# Patient Record
Sex: MALE | Race: WHITE | NOT HISPANIC OR LATINO | Employment: FULL TIME | URBAN - NONMETROPOLITAN AREA
[De-identification: names, ages, dates, MRNs, and addresses within clinical notes are randomized per-mention and may not be internally consistent; named-entity substitution may affect disease eponyms.]

---

## 2021-12-15 ENCOUNTER — APPOINTMENT (OUTPATIENT)
Dept: GENERAL RADIOLOGY | Facility: HOSPITAL | Age: 36
End: 2021-12-15

## 2021-12-15 ENCOUNTER — APPOINTMENT (OUTPATIENT)
Dept: CARDIOLOGY | Facility: HOSPITAL | Age: 36
End: 2021-12-15

## 2021-12-15 ENCOUNTER — HOSPITAL ENCOUNTER (INPATIENT)
Facility: HOSPITAL | Age: 36
LOS: 2 days | Discharge: HOME | End: 2021-12-17
Attending: STUDENT IN AN ORGANIZED HEALTH CARE EDUCATION/TRAINING PROGRAM | Admitting: INTERNAL MEDICINE

## 2021-12-15 DIAGNOSIS — I48.91 ATRIAL FIBRILLATION WITH RVR (HCC): Primary | ICD-10-CM

## 2021-12-15 LAB
ALBUMIN SERPL-MCNC: 4.93 G/DL (ref 3.5–5.2)
ALBUMIN/GLOB SERPL: 1.6 G/DL
ALP SERPL-CCNC: 123 U/L (ref 39–117)
ALT SERPL W P-5'-P-CCNC: 35 U/L (ref 1–41)
ANION GAP SERPL CALCULATED.3IONS-SCNC: 14.2 MMOL/L (ref 5–15)
APTT PPP: 33.9 SECONDS (ref 25.5–35.4)
APTT PPP: 42.1 SECONDS (ref 25.5–35.4)
AST SERPL-CCNC: 22 U/L (ref 1–40)
BASOPHILS # BLD AUTO: 0.03 10*3/MM3 (ref 0–0.2)
BASOPHILS # BLD AUTO: 0.04 10*3/MM3 (ref 0–0.2)
BASOPHILS NFR BLD AUTO: 0.4 % (ref 0–1.5)
BASOPHILS NFR BLD AUTO: 0.4 % (ref 0–1.5)
BH CV ECHO MEAS - AO ROOT AREA (BSA CORRECTED): 1.5
BH CV ECHO MEAS - AO ROOT AREA: 8.6 CM^2
BH CV ECHO MEAS - AO ROOT DIAM: 3.3 CM
BH CV ECHO MEAS - BSA(HAYCOCK): 2.3 M^2
BH CV ECHO MEAS - BSA: 2.3 M^2
BH CV ECHO MEAS - BZI_BMI: 31.2 KILOGRAMS/M^2
BH CV ECHO MEAS - BZI_METRIC_HEIGHT: 182.9 CM
BH CV ECHO MEAS - BZI_METRIC_WEIGHT: 104.3 KG
BH CV ECHO MEAS - EDV(CUBED): 59.3 ML
BH CV ECHO MEAS - EDV(MOD-SP4): 54.9 ML
BH CV ECHO MEAS - EDV(TEICH): 65.9 ML
BH CV ECHO MEAS - EF(CUBED): 58.9 %
BH CV ECHO MEAS - EF(MOD-SP4): 57.9 %
BH CV ECHO MEAS - EF(TEICH): 51.1 %
BH CV ECHO MEAS - ESV(CUBED): 24.4 ML
BH CV ECHO MEAS - ESV(MOD-SP4): 23.1 ML
BH CV ECHO MEAS - ESV(TEICH): 32.2 ML
BH CV ECHO MEAS - FS: 25.6 %
BH CV ECHO MEAS - IVS/LVPW: 0.93
BH CV ECHO MEAS - IVSD: 1.4 CM
BH CV ECHO MEAS - LA DIMENSION: 3.3 CM
BH CV ECHO MEAS - LA/AO: 1
BH CV ECHO MEAS - LV DIASTOLIC VOL/BSA (35-75): 24.3 ML/M^2
BH CV ECHO MEAS - LV MASS(C)D: 212.9 GRAMS
BH CV ECHO MEAS - LV MASS(C)DI: 94.2 GRAMS/M^2
BH CV ECHO MEAS - LV SYSTOLIC VOL/BSA (12-30): 10.2 ML/M^2
BH CV ECHO MEAS - LVIDD: 3.9 CM
BH CV ECHO MEAS - LVIDS: 2.9 CM
BH CV ECHO MEAS - LVLD AP4: 7.7 CM
BH CV ECHO MEAS - LVLS AP4: 6.9 CM
BH CV ECHO MEAS - LVOT AREA (M): 3.8 CM^2
BH CV ECHO MEAS - LVOT AREA: 3.8 CM^2
BH CV ECHO MEAS - LVOT DIAM: 2.2 CM
BH CV ECHO MEAS - LVPWD: 1.5 CM
BH CV ECHO MEAS - MV A MAX VEL: 52.7 CM/SEC
BH CV ECHO MEAS - MV E MAX VEL: 107 CM/SEC
BH CV ECHO MEAS - MV E/A: 2
BH CV ECHO MEAS - SI(CUBED): 15.5 ML/M^2
BH CV ECHO MEAS - SI(MOD-SP4): 14.1 ML/M^2
BH CV ECHO MEAS - SI(TEICH): 14.9 ML/M^2
BH CV ECHO MEAS - SV(CUBED): 34.9 ML
BH CV ECHO MEAS - SV(MOD-SP4): 31.8 ML
BH CV ECHO MEAS - SV(TEICH): 33.7 ML
BILIRUB SERPL-MCNC: 0.7 MG/DL (ref 0–1.2)
BUN SERPL-MCNC: 13 MG/DL (ref 6–20)
BUN/CREAT SERPL: 11.8 (ref 7–25)
CALCIUM SPEC-SCNC: 9.7 MG/DL (ref 8.6–10.5)
CHLORIDE SERPL-SCNC: 105 MMOL/L (ref 98–107)
CO2 SERPL-SCNC: 22.8 MMOL/L (ref 22–29)
CREAT SERPL-MCNC: 1.1 MG/DL (ref 0.76–1.27)
D DIMER PPP FEU-MCNC: <0.27 MCGFEU/ML (ref 0–0.5)
DEPRECATED RDW RBC AUTO: 39.1 FL (ref 37–54)
DEPRECATED RDW RBC AUTO: 40.3 FL (ref 37–54)
EOSINOPHIL # BLD AUTO: 0.12 10*3/MM3 (ref 0–0.4)
EOSINOPHIL # BLD AUTO: 0.24 10*3/MM3 (ref 0–0.4)
EOSINOPHIL NFR BLD AUTO: 1.7 % (ref 0.3–6.2)
EOSINOPHIL NFR BLD AUTO: 2.6 % (ref 0.3–6.2)
ERYTHROCYTE [DISTWIDTH] IN BLOOD BY AUTOMATED COUNT: 12 % (ref 12.3–15.4)
ERYTHROCYTE [DISTWIDTH] IN BLOOD BY AUTOMATED COUNT: 12.3 % (ref 12.3–15.4)
FLUAV SUBTYP SPEC NAA+PROBE: NOT DETECTED
FLUBV RNA ISLT QL NAA+PROBE: NOT DETECTED
GFR SERPL CREATININE-BSD FRML MDRD: 75 ML/MIN/1.73
GLOBULIN UR ELPH-MCNC: 3.2 GM/DL
GLUCOSE SERPL-MCNC: 103 MG/DL (ref 65–99)
HBA1C MFR BLD: 5.5 % (ref 4.8–5.6)
HCT VFR BLD AUTO: 44.2 % (ref 37.5–51)
HCT VFR BLD AUTO: 48.8 % (ref 37.5–51)
HGB BLD-MCNC: 15 G/DL (ref 13–17.7)
HGB BLD-MCNC: 16.9 G/DL (ref 13–17.7)
HOLD SPECIMEN: NORMAL
HOLD SPECIMEN: NORMAL
IMM GRANULOCYTES # BLD AUTO: 0.01 10*3/MM3 (ref 0–0.05)
IMM GRANULOCYTES # BLD AUTO: 0.02 10*3/MM3 (ref 0–0.05)
IMM GRANULOCYTES NFR BLD AUTO: 0.1 % (ref 0–0.5)
IMM GRANULOCYTES NFR BLD AUTO: 0.2 % (ref 0–0.5)
INR PPP: 1.02 (ref 0.9–1.1)
LYMPHOCYTES # BLD AUTO: 2.05 10*3/MM3 (ref 0.7–3.1)
LYMPHOCYTES # BLD AUTO: 2.51 10*3/MM3 (ref 0.7–3.1)
LYMPHOCYTES NFR BLD AUTO: 27.5 % (ref 19.6–45.3)
LYMPHOCYTES NFR BLD AUTO: 28.3 % (ref 19.6–45.3)
MAGNESIUM SERPL-MCNC: 2.1 MG/DL (ref 1.6–2.6)
MAXIMAL PREDICTED HEART RATE: 183 BPM
MCH RBC QN AUTO: 30.6 PG (ref 26.6–33)
MCH RBC QN AUTO: 30.7 PG (ref 26.6–33)
MCHC RBC AUTO-ENTMCNC: 33.9 G/DL (ref 31.5–35.7)
MCHC RBC AUTO-ENTMCNC: 34.6 G/DL (ref 31.5–35.7)
MCV RBC AUTO: 88.6 FL (ref 79–97)
MCV RBC AUTO: 90.2 FL (ref 79–97)
MONOCYTES # BLD AUTO: 0.33 10*3/MM3 (ref 0.1–0.9)
MONOCYTES # BLD AUTO: 0.55 10*3/MM3 (ref 0.1–0.9)
MONOCYTES NFR BLD AUTO: 4.6 % (ref 5–12)
MONOCYTES NFR BLD AUTO: 6 % (ref 5–12)
NEUTROPHILS NFR BLD AUTO: 4.71 10*3/MM3 (ref 1.7–7)
NEUTROPHILS NFR BLD AUTO: 5.78 10*3/MM3 (ref 1.7–7)
NEUTROPHILS NFR BLD AUTO: 63.3 % (ref 42.7–76)
NEUTROPHILS NFR BLD AUTO: 64.9 % (ref 42.7–76)
NRBC BLD AUTO-RTO: 0 /100 WBC (ref 0–0.2)
NRBC BLD AUTO-RTO: 0 /100 WBC (ref 0–0.2)
PLATELET # BLD AUTO: 220 10*3/MM3 (ref 140–450)
PLATELET # BLD AUTO: 229 10*3/MM3 (ref 140–450)
PMV BLD AUTO: 10.2 FL (ref 6–12)
PMV BLD AUTO: 10.7 FL (ref 6–12)
POTASSIUM SERPL-SCNC: 3.7 MMOL/L (ref 3.5–5.2)
PROT SERPL-MCNC: 8.1 G/DL (ref 6–8.5)
PROTHROMBIN TIME: 13.8 SECONDS (ref 12.8–14.5)
QT INTERVAL: 292 MS
QT INTERVAL: 360 MS
QTC INTERVAL: 438 MS
QTC INTERVAL: 479 MS
RBC # BLD AUTO: 4.9 10*6/MM3 (ref 4.14–5.8)
RBC # BLD AUTO: 5.51 10*6/MM3 (ref 4.14–5.8)
SARS-COV-2 RNA PNL SPEC NAA+PROBE: NOT DETECTED
SODIUM SERPL-SCNC: 142 MMOL/L (ref 136–145)
STRESS TARGET HR: 156 BPM
T4 FREE SERPL-MCNC: 1.5 NG/DL (ref 0.93–1.7)
TROPONIN T SERPL-MCNC: <0.01 NG/ML (ref 0–0.03)
TSH SERPL DL<=0.05 MIU/L-ACNC: 0.7 UIU/ML (ref 0.27–4.2)
WBC NRBC COR # BLD: 7.25 10*3/MM3 (ref 3.4–10.8)
WBC NRBC COR # BLD: 9.14 10*3/MM3 (ref 3.4–10.8)
WHOLE BLOOD HOLD SPECIMEN: NORMAL
WHOLE BLOOD HOLD SPECIMEN: NORMAL

## 2021-12-15 PROCEDURE — 99223 1ST HOSP IP/OBS HIGH 75: CPT | Performed by: INTERNAL MEDICINE

## 2021-12-15 PROCEDURE — 85610 PROTHROMBIN TIME: CPT | Performed by: NURSE PRACTITIONER

## 2021-12-15 PROCEDURE — 93005 ELECTROCARDIOGRAM TRACING: CPT | Performed by: STUDENT IN AN ORGANIZED HEALTH CARE EDUCATION/TRAINING PROGRAM

## 2021-12-15 PROCEDURE — 36415 COLL VENOUS BLD VENIPUNCTURE: CPT

## 2021-12-15 PROCEDURE — 99284 EMERGENCY DEPT VISIT MOD MDM: CPT

## 2021-12-15 PROCEDURE — 85730 THROMBOPLASTIN TIME PARTIAL: CPT | Performed by: NURSE PRACTITIONER

## 2021-12-15 PROCEDURE — 80053 COMPREHEN METABOLIC PANEL: CPT | Performed by: STUDENT IN AN ORGANIZED HEALTH CARE EDUCATION/TRAINING PROGRAM

## 2021-12-15 PROCEDURE — 25010000002 HEPARIN (PORCINE) PER 1000 UNITS: Performed by: INTERNAL MEDICINE

## 2021-12-15 PROCEDURE — 84443 ASSAY THYROID STIM HORMONE: CPT | Performed by: PHYSICIAN ASSISTANT

## 2021-12-15 PROCEDURE — 83036 HEMOGLOBIN GLYCOSYLATED A1C: CPT | Performed by: PHYSICIAN ASSISTANT

## 2021-12-15 PROCEDURE — 84439 ASSAY OF FREE THYROXINE: CPT | Performed by: PHYSICIAN ASSISTANT

## 2021-12-15 PROCEDURE — 93005 ELECTROCARDIOGRAM TRACING: CPT | Performed by: INTERNAL MEDICINE

## 2021-12-15 PROCEDURE — 25010000002 HEPARIN (PORCINE) PER 1000 UNITS: Performed by: NURSE PRACTITIONER

## 2021-12-15 PROCEDURE — 93306 TTE W/DOPPLER COMPLETE: CPT

## 2021-12-15 PROCEDURE — 99222 1ST HOSP IP/OBS MODERATE 55: CPT | Performed by: INTERNAL MEDICINE

## 2021-12-15 PROCEDURE — 93010 ELECTROCARDIOGRAM REPORT: CPT | Performed by: INTERNAL MEDICINE

## 2021-12-15 PROCEDURE — 85025 COMPLETE CBC W/AUTO DIFF WBC: CPT | Performed by: NURSE PRACTITIONER

## 2021-12-15 PROCEDURE — 84484 ASSAY OF TROPONIN QUANT: CPT | Performed by: INTERNAL MEDICINE

## 2021-12-15 PROCEDURE — 84484 ASSAY OF TROPONIN QUANT: CPT | Performed by: STUDENT IN AN ORGANIZED HEALTH CARE EDUCATION/TRAINING PROGRAM

## 2021-12-15 PROCEDURE — 93306 TTE W/DOPPLER COMPLETE: CPT | Performed by: INTERNAL MEDICINE

## 2021-12-15 PROCEDURE — 85025 COMPLETE CBC W/AUTO DIFF WBC: CPT | Performed by: STUDENT IN AN ORGANIZED HEALTH CARE EDUCATION/TRAINING PROGRAM

## 2021-12-15 PROCEDURE — 87636 SARSCOV2 & INF A&B AMP PRB: CPT | Performed by: PHYSICIAN ASSISTANT

## 2021-12-15 PROCEDURE — 85379 FIBRIN DEGRADATION QUANT: CPT | Performed by: PHYSICIAN ASSISTANT

## 2021-12-15 PROCEDURE — 85730 THROMBOPLASTIN TIME PARTIAL: CPT | Performed by: INTERNAL MEDICINE

## 2021-12-15 PROCEDURE — 83735 ASSAY OF MAGNESIUM: CPT | Performed by: INTERNAL MEDICINE

## 2021-12-15 PROCEDURE — 71045 X-RAY EXAM CHEST 1 VIEW: CPT

## 2021-12-15 RX ORDER — ASPIRIN 81 MG/1
324 TABLET, CHEWABLE ORAL ONCE
Status: COMPLETED | OUTPATIENT
Start: 2021-12-15 | End: 2021-12-15

## 2021-12-15 RX ORDER — HEPARIN SODIUM 5000 [USP'U]/ML
5000 INJECTION, SOLUTION INTRAVENOUS; SUBCUTANEOUS AS NEEDED
Status: DISCONTINUED | OUTPATIENT
Start: 2021-12-15 | End: 2021-12-17

## 2021-12-15 RX ORDER — PANTOPRAZOLE SODIUM 40 MG/1
40 TABLET, DELAYED RELEASE ORAL
Status: DISCONTINUED | OUTPATIENT
Start: 2021-12-16 | End: 2021-12-17 | Stop reason: HOSPADM

## 2021-12-15 RX ORDER — NITROGLYCERIN 0.4 MG/1
0.4 TABLET SUBLINGUAL
Status: DISCONTINUED | OUTPATIENT
Start: 2021-12-15 | End: 2021-12-17 | Stop reason: HOSPADM

## 2021-12-15 RX ORDER — HEPARIN SODIUM 5000 [USP'U]/ML
2500 INJECTION, SOLUTION INTRAVENOUS; SUBCUTANEOUS AS NEEDED
Status: DISCONTINUED | OUTPATIENT
Start: 2021-12-15 | End: 2021-12-17

## 2021-12-15 RX ORDER — NICOTINE 21 MG/24HR
1 PATCH, TRANSDERMAL 24 HOURS TRANSDERMAL
Status: DISCONTINUED | OUTPATIENT
Start: 2021-12-15 | End: 2021-12-17 | Stop reason: HOSPADM

## 2021-12-15 RX ORDER — SODIUM CHLORIDE 0.9 % (FLUSH) 0.9 %
10 SYRINGE (ML) INJECTION AS NEEDED
Status: DISCONTINUED | OUTPATIENT
Start: 2021-12-15 | End: 2021-12-17 | Stop reason: HOSPADM

## 2021-12-15 RX ORDER — HEPARIN SODIUM 5000 [USP'U]/ML
5000 INJECTION, SOLUTION INTRAVENOUS; SUBCUTANEOUS ONCE
Status: COMPLETED | OUTPATIENT
Start: 2021-12-15 | End: 2021-12-15

## 2021-12-15 RX ORDER — DILTIAZEM HYDROCHLORIDE 5 MG/ML
INJECTION INTRAVENOUS
Status: COMPLETED
Start: 2021-12-15 | End: 2021-12-15

## 2021-12-15 RX ORDER — ACETAMINOPHEN 325 MG/1
650 TABLET ORAL EVERY 6 HOURS PRN
Status: DISCONTINUED | OUTPATIENT
Start: 2021-12-15 | End: 2021-12-17 | Stop reason: HOSPADM

## 2021-12-15 RX ORDER — FAMOTIDINE 10 MG/ML
20 INJECTION, SOLUTION INTRAVENOUS ONCE
Status: COMPLETED | OUTPATIENT
Start: 2021-12-15 | End: 2021-12-15

## 2021-12-15 RX ORDER — SODIUM CHLORIDE 0.9 % (FLUSH) 0.9 %
10 SYRINGE (ML) INJECTION EVERY 12 HOURS SCHEDULED
Status: DISCONTINUED | OUTPATIENT
Start: 2021-12-15 | End: 2021-12-17 | Stop reason: HOSPADM

## 2021-12-15 RX ORDER — HEPARIN SODIUM 10000 [USP'U]/100ML
9.62 INJECTION, SOLUTION INTRAVENOUS
Status: DISCONTINUED | OUTPATIENT
Start: 2021-12-15 | End: 2021-12-17

## 2021-12-15 RX ADMIN — HEPARIN SODIUM 9.62 UNITS/KG/HR: 5000 INJECTION INTRAVENOUS; SUBCUTANEOUS at 13:11

## 2021-12-15 RX ADMIN — DILTIAZEM HYDROCHLORIDE: 5 INJECTION INTRAVENOUS at 03:38

## 2021-12-15 RX ADMIN — SODIUM CHLORIDE 1000 ML: 9 INJECTION, SOLUTION INTRAVENOUS at 03:36

## 2021-12-15 RX ADMIN — NICOTINE 1 PATCH: 14 PATCH, EXTENDED RELEASE TRANSDERMAL at 17:10

## 2021-12-15 RX ADMIN — HEPARIN SODIUM 5000 UNITS: 5000 INJECTION INTRAVENOUS; SUBCUTANEOUS at 13:11

## 2021-12-15 RX ADMIN — FAMOTIDINE 20 MG: 10 INJECTION INTRAVENOUS at 03:55

## 2021-12-15 RX ADMIN — SODIUM CHLORIDE 1000 ML: 9 INJECTION, SOLUTION INTRAVENOUS at 10:15

## 2021-12-15 RX ADMIN — HEPARIN SODIUM 5000 UNITS: 5000 INJECTION INTRAVENOUS; SUBCUTANEOUS at 20:12

## 2021-12-15 RX ADMIN — DILTIAZEM HYDROCHLORIDE 5 MG/HR: 100 INJECTION, POWDER, LYOPHILIZED, FOR SOLUTION INTRAVENOUS at 04:40

## 2021-12-15 RX ADMIN — ASPIRIN 324 MG: 81 TABLET, CHEWABLE ORAL at 03:41

## 2021-12-15 NOTE — PLAN OF CARE
Goal Outcome Evaluation:               Patient admitted from ER this shift. Patient has cardizem drip at 10 ml/hr and heparin drip @ 9.62 units/kg/hr currently infusing. Patient denies any chest pain at this time. No s/s of acute distress noted. Will continue with plan of care.

## 2021-12-15 NOTE — H&P
[facility] Medicine Services  HISTORY & PHYSICAL    Patient Identification:  Name:  Donald Reno  Age:  36 y.o.  Sex:  male  :  1985  MRN:  [id number]   Visit Number:  [id number]  Admit Date: 12/15/2021   Primary Care Physician:  Provider, No Known     Subjective     Chief complaint:   Chief Complaint   Patient presents with   • Chest Pain     History of presenting illness:   Patient is a 36 y.o. male with past medical history significant for obesity and ongoing tobacco that presented to the [facility] emergency department for evaluation of chest pain and palpitations.  Patient states that upon waking this morning, he was having acid reflux symptoms.  Patient states that he felt like he was suffocating.  Patient reports similar symptoms in the past with acid reflux.  However, after this resolved, he developed diffuse anterior chest wall tightness and heart palpitations. Patient states he felt his heart was beating out of his chest.  He denies similar symptoms in the past. Denies any aggravators or relievers of his symptoms. Thus, he came to ED for evaluation.  Patient denies any dyspnea.  No fevers or chills.  He denies any abdominal pain, nausea, vomiting or change in bowel movements.  No urinary complaints.  No headache or dizziness, no LOC.  He denies taking any daily medications.  He is a daily smoker, 1 pack/day for roughly 22 years.  He reports family history of coronary artery disease, his father had an MI in his 50s.  Denies any known family history of arrhythmia.  Upon further questioning, patient does report drinking 4-5 diet Mountain Dew's per day and 1-2 monster energy drinks.    Upon arrival to the ED, vitals were temperature 97.8, heart rate 1 16-1 44, respiratory rate 20, blood pressure 121/83, and oxygen saturation 99% on room air.  Troponin T negative x2.  CMP with glucose 103, otherwise grossly unremarkable.  CBC grossly unremarkable.  TSH 0.701.   D-dimer negative.  COVID-19 and influenza screening negative.  Chest x-ray with no evidence of acute cardiopulmonary disease.  Admission EKG with atrial fibrillation with rapid ventricular response with heart rate in the 160s.  Known ED medication administration: 324 mg p.o. aspirin, 20 mg IV Pepcid, 5000 unit heparin IV bolus, 10 mg IV Cardizem bolus, 2 L bolus normal saline, patient was also started on a Cardizem drip and heparin drip per protocol.      Patient has been admitted to the telemetry floor for further evaluation and treatment.     Present during exam: N/A   ---------------------------------------------------------------------------------------------------------------------   Review of Systems   Constitutional: Negative for activity change, appetite change, chills, diaphoresis, fatigue and fever.   HENT: Negative for congestion and sore throat.    Eyes: Negative for discharge and visual disturbance.   Respiratory: Positive for chest tightness. Negative for cough, shortness of breath and wheezing.    Cardiovascular: Positive for chest pain and palpitations. Negative for leg swelling.   Gastrointestinal: Negative for abdominal pain, constipation, diarrhea, nausea and vomiting.   Genitourinary: Negative for decreased urine volume, dysuria, frequency and urgency.   Musculoskeletal: Negative for arthralgias and myalgias.   Skin: Negative for rash and wound.   Neurological: Negative for dizziness, syncope, weakness, light-headedness and headaches.   Psychiatric/Behavioral: Negative for confusion. The patient is not nervous/anxious.       ---------------------------------------------------------------------------------------------------------------------   Past Medical History:   Diagnosis Date   • Obesity (BMI 30-39.9)      Past Surgical History:   Procedure Laterality Date   • TONSILLECTOMY       Family History   Problem Relation Age of Onset   • Heart disease Father    • Heart attack Father      Social  History     Socioeconomic History   • Marital status:    Tobacco Use   • Smoking status: Current Every Day Smoker     Packs/day: 1.00     Years: 22.00     Pack years: 22.00   • Smokeless tobacco: Never Used   Substance and Sexual Activity   • Alcohol use: Never   • Drug use: Never   • Sexual activity: Defer     ---------------------------------------------------------------------------------------------------------------------   Allergies:  Cherry and Grapefruit  ---------------------------------------------------------------------------------------------------------------------   Medications below are reported home medications pulling from within the system; at this time, these medications have not been reconciled unless otherwise specified and are in the verification process for further verifcation as current home medications.    Prior to Admission Medications     None        ---------------------------------------------------------------------------------------------------------------------    Objective     Hospital Scheduled Meds:  [START ON 12/16/2021] pantoprazole, 40 mg, Oral, Q AM  sodium chloride, 10 mL, Intravenous, Q12H      dilTIAZem, 5-15 mg/hr, Last Rate: 10 mg/hr (12/15/21 1030)  heparin (porcine), 9.62 Units/kg/hr, Last Rate: 9.62 Units/kg/hr (12/15/21 1311)      Current listed hospital scheduled medications may not yet reflect those currently placed in orders that are signed and held, awaiting patient's arrival to floor/unit.    ---------------------------------------------------------------------------------------------------------------------   Vital Signs:  Temp:  [97.5 °F (36.4 °C)-98.1 °F (36.7 °C)] 97.5 °F (36.4 °C)  Heart Rate:  [] 92  Resp:  [20] 20  BP: ()/() 107/59  Mean Arterial Pressure (Non-Invasive) for the past 24 hrs (Last 3 readings):   Noninvasive MAP (mmHg)   12/15/21 1348 72   12/15/21 1303 77   12/15/21 1248 73     SpO2 Percentage    12/15/21 1348  12/15/21 1353 12/15/21 1358   SpO2: 98% 100% 98%     SpO2:  [93 %-100 %] 98 %  on   ;   Device (Oxygen Therapy): room air    Body mass index is 31.1 kg/m².  Wt Readings from Last 3 Encounters:   12/15/21 104 kg (229 lb 4.8 oz)       ---------------------------------------------------------------------------------------------------------------------   Physical Exam:  Physical Exam  Nursing note reviewed.   Constitutional:       General: He is awake. He is not in acute distress.     Appearance: He is well-developed. He is obese. He is not toxic-appearing.      Comments: On initial exam, patient lying in bed snoring.  Easy to awake.  No acute distress noted.  On room air.  No family present bedside.   HENT:      Head: Normocephalic and atraumatic.      Mouth/Throat:      Mouth: Mucous membranes are moist.   Eyes:      Conjunctiva/sclera: Conjunctivae normal.      Pupils: Pupils are equal, round, and reactive to light.   Neck:      Vascular: No carotid bruit.   Cardiovascular:      Rate and Rhythm: Normal rate. Rhythm irregularly irregular.      Pulses:           Dorsalis pedis pulses are 2+ on the right side and 2+ on the left side.        Posterior tibial pulses are 2+ on the right side and 2+ on the left side.      Heart sounds: Normal heart sounds. No murmur heard.  No friction rub. No gallop.    Pulmonary:      Effort: Pulmonary effort is normal. No tachypnea, accessory muscle usage or respiratory distress.      Breath sounds: Normal breath sounds and air entry. No wheezing, rhonchi or rales.      Comments: On room air, speaks in full sentences without dyspnea.  Abdominal:      General: Bowel sounds are normal. There is no distension.      Palpations: Abdomen is soft.      Tenderness: There is no abdominal tenderness. There is no guarding or rebound.   Genitourinary:     Comments: No bay catheter in place.  Musculoskeletal:      Cervical back: Neck supple.      Right lower leg: No edema.      Left lower leg: No  edema.   Skin:     General: Skin is warm and dry.      Capillary Refill: Capillary refill takes less than 2 seconds.   Neurological:      General: No focal deficit present.      Mental Status: He is alert and oriented to person, place, and time.      GCS: GCS eye subscore is 4. GCS verbal subscore is 5. GCS motor subscore is 6.      Cranial Nerves: Cranial nerves are intact.      Sensory: Sensation is intact.      Motor: Motor function is intact.      Comments: Awake and alert. Follows commands. Answers questions appropriately. Moves all extremities equally. Strength and sensation intact. No focal neuro deficit on exam.   Psychiatric:         Attention and Perception: Attention normal.         Mood and Affect: Mood and affect normal.         Speech: Speech normal.         Behavior: Behavior is cooperative.         Cognition and Memory: Cognition and memory normal.       ---------------------------------------------------------------------------------------------------------------------  EKG:  Pending cardiology read. Per my interpretation: Atrial fibrillation with RVR,  BPM, QTc 479 m/s, no evidence of ST elevation. Await final cardiology read.    Telemetry:    Atrial fibrillation 80s; patient with 11 pauses in past hour longest being 2.3 seconds     I have personally reviewed the EKG/Telemetry strip  ---------------------------------------------------------------------------------------------------------------------   Results from last 7 days   Lab Units 12/15/21  1434 12/15/21  0547 12/15/21  0344   TROPONIN T ng/mL <0.010 <0.010 <0.010           Results from last 7 days   Lab Units 12/15/21  1257 12/15/21  0343   WBC 10*3/mm3 7.25 9.14   HEMOGLOBIN g/dL 15.0 16.9   HEMATOCRIT % 44.2 48.8   MCV fL 90.2 88.6   MCHC g/dL 33.9 34.6   PLATELETS 10*3/mm3 220 229   INR  1.02  --      Results from last 7 days   Lab Units 12/15/21  0547 12/15/21  0344   SODIUM mmol/L  --  142   POTASSIUM mmol/L  --  3.7   MAGNESIUM  mg/dL 2.1  --    CHLORIDE mmol/L  --  105   CO2 mmol/L  --  22.8   BUN mg/dL  --  13   CREATININE mg/dL  --  1.10   EGFR IF NONAFRICN AM mL/min/1.73  --  75   CALCIUM mg/dL  --  9.7   GLUCOSE mg/dL  --  103*   ALBUMIN g/dL  --  4.93   BILIRUBIN mg/dL  --  0.7   ALK PHOS U/L  --  123*   AST (SGOT) U/L  --  22   ALT (SGPT) U/L  --  35   Estimated Creatinine Clearance: 114.7 mL/min (by C-G formula based on SCr of 1.1 mg/dL).  No results found for: HGBA1C  Lab Results   Component Value Date    TSH 0.701 12/15/2021     Microbiology Results (last 10 days)     Procedure Component Value - Date/Time    COVID-19 and FLU A/B PCR - Swab, Nasopharynx [[id number]]  (Normal) Collected: 12/15/21 0634    Lab Status: Final result Specimen: Swab from Nasopharynx Updated: 12/15/21 0720     COVID19 Not Detected     Influenza A PCR Not Detected     Influenza B PCR Not Detected    Narrative:      Fact sheet for providers: https://www.fda.gov/media/391780/download    Fact sheet for patients: https://www.fda.gov/media/913028/download    Test performed by PCR.         Pain Management Panel    There is no flowsheet data to display.       I have personally reviewed the above laboratory results.   ---------------------------------------------------------------------------------------------------------------------  Imaging Results (Last 7 Days)     Procedure Component Value Units Date/Time    XR Chest 1 View [[id number]] Collected: 12/15/21 0512     Updated: 12/15/21 0514    Narrative:      CR Chest 1 Vw    INDICATION:   Chest pain.     COMPARISON:    12/25/2008    FINDINGS:  Portable AP view(s) of the chest.  The heart and mediastinal contours are normal. The lungs are clear. No pneumothorax or pleural effusion.      Impression:      No acute cardiopulmonary findings.    Signer Name: Donald Porras MD   Signed: 12/15/2021 5:12 AM   Workstation Name: [facility]    [facility]        I have personally reviewed the above  radiology results.   ---------------------------------------------------------------------------------------------------------------------    Assessment & Plan      -New onset atrial fibrillation with rapid ventricular response   -Chest pain, atypical features  -Ventricular pauses  • Admission EKG reviewed with heart rate in the 160s  • Troponin T negative x 2   • Patient was administered a Cardizem bolus in ED and started on a Cardizem drip.  Currently, Cardizem drip is at 10 mg/h.  Patient's heart rate is improved to the 80s.  On review of telemetry, patient has had upwards of 10-11 ventricular pauses the longest being 2.3 seconds.  Discussed with RN, will decrease drip to 5 mg/h, order placed.  • [id number] = 0. Heparin drip and bolus also administered in ED.  Continue heparin drip.  • Obtain TTE  • TSH within normal limits.  Magnesium within normal limits.  • Patient does report significant caffeine intake  • Obtain UDS  • Serial troponin T and EKG  • Cardiology consult on board, input/assistance much appreciated.    -Mild hyperglycemia without hx of diabetes mellitus  • Obtain HgbA1C    -Obesity by BMI, Body mass index is 31.1 kg/m².  • Affecting all aspects of care    -Tobacco abuse   · Encourage cessation   · NRT available     -F/E/N  • No IV fluids. Replace electrolytes per protocol as necessary. Cardiac diet.     ---------------------------------------------------  DVT Prophylaxis: Heparin gtt   GI Prophylaxis: PPI  Activity: Up with assistance as tolerated     The patient is considered to be a high risk patient due to: New onset atrial fibrillation with RVR    INPATIENT status due to the need for care which can only be reasonably provided in an hospital setting such as aggressive/expedited ancillary services and/or consultation services, the necessity for IV medications, close physician monitoring and/or the possible need for procedures.  In such, I feel patient’s risk for adverse outcomes and need for  care warrant INPATIENT evaluation and predict the patient’s care encounter to likely last beyond 2 midnights.    Code Status: FULL CODE     Disposition/Discharge planning: Plans on home at discharge once medically stable     I have discussed the patient's assessment and plan with the patient, nursing staff, and attending physician DO Ivanna Shore PA-C  [facility]   Pager: [phone]    12/15/21  15:23 EST    Attending Physician: Sabiha Freeman DO       ---------------------------------------------------------------------------------------------------------------------

## 2021-12-15 NOTE — ED NOTES
Awaiting pharmacy to deliver heparin infusion, Elizabeth, pharmacist made aware of need of medication.     Luz Marina Garcia, RN  12/15/21 1300

## 2021-12-15 NOTE — ED PROVIDER NOTES
Subjective     History provided by:  Patient  Chest Pain  Pain location:  Substernal area  Pain quality: aching and burning    Pain radiates to:  Does not radiate  Pain severity:  Moderate  Onset quality:  Sudden  Duration:  2 hours  Timing:  Constant  Progression:  Worsening  Chronicity:  New  Context: at rest    Context comment:  Waking from sleep  Relieved by:  Nothing  Associated symptoms: heartburn and palpitations    Associated symptoms: no abdominal pain and no fever    Risk factors: obesity and smoking        Review of Systems   Constitutional: Negative.  Negative for fever.   HENT: Negative.    Respiratory: Negative.    Cardiovascular: Positive for chest pain and palpitations.   Gastrointestinal: Positive for heartburn. Negative for abdominal pain.   Endocrine: Negative.    Genitourinary: Negative.  Negative for dysuria.   Skin: Negative.    Neurological: Negative.    Psychiatric/Behavioral: Negative.    All other systems reviewed and are negative.      Past Medical History:   Diagnosis Date   • Obesity (BMI 30-39.9)    • Tobacco abuse        Allergies   Allergen Reactions   • Juarez Swelling   • Grapefruit Swelling       Past Surgical History:   Procedure Laterality Date   • TONSILLECTOMY         Family History   Problem Relation Age of Onset   • Heart disease Father    • Heart attack Father        Social History     Socioeconomic History   • Marital status:    Tobacco Use   • Smoking status: Current Every Day Smoker     Packs/day: 1.00     Years: 22.00     Pack years: 22.00   • Smokeless tobacco: Never Used   Substance and Sexual Activity   • Alcohol use: Never   • Drug use: Never   • Sexual activity: Defer           Objective   Physical Exam  Vitals and nursing note reviewed.   Constitutional:       General: He is not in acute distress.     Appearance: He is well-developed. He is not diaphoretic.   HENT:      Head: Normocephalic and atraumatic.      Right Ear: External ear normal.      Left Ear:  External ear normal.      Nose: Nose normal.   Eyes:      Conjunctiva/sclera: Conjunctivae normal.   Neck:      Vascular: No JVD.      Trachea: No tracheal deviation.   Cardiovascular:      Rate and Rhythm: Tachycardia present. Rhythm irregular.      Heart sounds: No murmur heard.       Comments: Afib with RVR  Pulmonary:      Effort: Pulmonary effort is normal. No respiratory distress.      Breath sounds: No wheezing.   Abdominal:      Palpations: Abdomen is soft.      Tenderness: There is no abdominal tenderness.   Musculoskeletal:         General: No deformity. Normal range of motion.      Cervical back: Normal range of motion and neck supple.   Skin:     General: Skin is warm and dry.      Coloration: Skin is not pale.      Findings: No erythema or rash.   Neurological:      Mental Status: He is alert and oriented to person, place, and time.      Cranial Nerves: No cranial nerve deficit.   Psychiatric:         Behavior: Behavior normal.         Thought Content: Thought content normal.         Procedures           ED Course  ED Course as of 12/15/21 1916   Wed Dec 15, 2021   0337 EKG noted atrial fibrillation with rapid ventricular response.  160 bpm.  QRS 70 ms.  QTc 479 ms.  No acute ST abnormality [SF]   0602 CXR rad interpreted:  No acute cardiopulmonary findings.    [RB]   0813 Took over for Jeovany Hemphill.  Patient resting comfortably in no acute distress.  Heart rate is currently 93 [JI]   0842 Discussed with Neva from cardiology who is coming to see the patient [JI]   1117 Unfortunately no beds in the area.  Patient is hemodynamically stable stable [JI]   1239 Paged the hospitalist [JI]   1251 Patient was accepted by Dr. Freeman [JI]      ED Course User Index  [JI] Gustavo Douglass PA  [RB] Lawson Hemphill II, PA  [SF] Sonny Mckeon DO                                                 MDM  Number of Diagnoses or Management Options  Atrial fibrillation with RVR (HCC): new and requires workup     Amount and/or  Complexity of Data Reviewed  Clinical lab tests: ordered and reviewed  Tests in the radiology section of CPT®: ordered and reviewed        Final diagnoses:   Atrial fibrillation with RVR (HCC)       ED Disposition  ED Disposition     ED Disposition Condition Comment    Decision to Admit  Level of Care: Telemetry [5]   Diagnosis: Afib (HCC) [604795]   Admitting Physician: SALINA TILLMAN [[id number]]   Certification: I Certify That Inpatient Hospital Services Are Medically Necessary For Greater Than 2 Midnights            No follow-up provider specified.       Medication List      No changes were made to your prescriptions during this visit.          Gustavo Douglass PA  12/15/21 1916

## 2021-12-15 NOTE — CONSULTS
Consults  Date of Admit: 12/15/2021  Date of Consult: 12/15/21  No ref. provider found  Donald Reno  1985    Consulting Physician: Pawan Quiñones MD    Cardiology consultation    Reason for consultation:  Atrial fibrillation with RVR      History of Present Illness    Subjective     Chief Complaint   Patient presents with   • Chest Pain       Donald Reno is a 36 y.o. male with past medical history significant for no pertinent past medical history.  He presented to the ED on 10/15/2021.  He states that he woke up gasping for air and felt tightness in his chest.  Although he felt bad, he did go on to work.  Patient is a  and was due at work at 1 AM.  While at work he felt his heart beating fast and irregular.  He then decided that he could not work and came to the ED.  Cardiology was consulted due to patient's atrial fibrillation with RVR.    The patient denies past medical history of congenital heart defect or rheumatic fever.  He states he does not take any medications chronically at home.  He reports tobacco use x23 years, 1 pack/day.  He denies any alcohol or street drug use.    Mr. Reno does state that his father  at the age of 54 from an MI.  His mother has a history of mitral valve prolapse he has a sister who was born with a heart murmur and  of cancer at age 15.    Last Echo:      History reviewed. No pertinent past medical history.  History reviewed. No pertinent surgical history.  History reviewed. No pertinent family history.  Social History     Tobacco Use   • Smoking status: Not on file   • Smokeless tobacco: Not on file   Substance Use Topics   • Alcohol use: Never   • Drug use: Not on file     (Not in a hospital admission)    Allergies:  Cherry and Grapefruit    Review of Systems   Constitutional: Negative for fatigue.   Respiratory: Positive for shortness of breath.    Cardiovascular: Positive for chest pain and palpitations. Negative for leg swelling.    Gastrointestinal: Negative for blood in stool.   Neurological: Negative for dizziness, syncope, weakness and light-headedness.   Hematological: Does not bruise/bleed easily.   All other systems reviewed and are negative.        Objective      Vital Signs  Temp:  [97.8 °F (36.6 °C)] 97.8 °F (36.6 °C)  Heart Rate:  [] 82  Resp:  [20] 20  BP: ()/() 106/82  Body mass index is 1,122.97 kg/m².    Intake/Output Summary (Last 24 hours) at 12/15/2021 1246  Last data filed at 12/15/2021 1045  Gross per 24 hour   Intake 3000 ml   Output --   Net 3000 ml       Physical Exam  Vitals reviewed.   Constitutional:       Appearance: Normal appearance. He is well-developed.   HENT:      Head: Normocephalic and atraumatic.   Eyes:      Pupils: Pupils are equal, round, and reactive to light.   Neck:      Vascular: No JVD.   Cardiovascular:      Rate and Rhythm: Tachycardia present. Rhythm irregular.      Heart sounds: No murmur heard.  No friction rub. No gallop.    Pulmonary:      Effort: Pulmonary effort is normal. No respiratory distress.      Breath sounds: Normal breath sounds. No wheezing or rales.   Abdominal:      Palpations: Abdomen is soft. There is no mass.      Tenderness: There is no abdominal tenderness.      Hernia: No hernia is present.   Skin:     General: Skin is warm and dry.   Neurological:      Mental Status: He is alert and oriented to person, place, and time.   Psychiatric:         Mood and Affect: Mood normal.         Behavior: Behavior normal.         Telemetry: A. fib 90s with accelerations up to the 130s    Results Review:   I reviewed the patient's new clinical results.  Results from last 7 days   Lab Units 12/15/21  0547 12/15/21  0344   TROPONIN T ng/mL <0.010 <0.010     Results from last 7 days   Lab Units 12/15/21  0343   WBC 10*3/mm3 9.14   HEMOGLOBIN g/dL 16.9   PLATELETS 10*3/mm3 229     Results from last 7 days   Lab Units 12/15/21  0344   SODIUM mmol/L 142   POTASSIUM mmol/L 3.7    CHLORIDE mmol/L 105   CO2 mmol/L 22.8   BUN mg/dL 13   CREATININE mg/dL 1.10   CALCIUM mg/dL 9.7   GLUCOSE mg/dL 103*   ALT (SGPT) U/L 35   AST (SGOT) U/L 22     No results found for: INR  No results found for: MG  Lab Results   Component Value Date    TSH 0.701 12/15/2021      No results found for: BNP     EKG:         Imaging Results (Last 72 Hours)     Procedure Component Value Units Date/Time    XR Chest 1 View [[id number]] Collected: 12/15/21 0512     Updated: 12/15/21 0514    Narrative:      CR Chest 1 Vw    INDICATION:   Chest pain.     COMPARISON:    12/25/2008    FINDINGS:  Portable AP view(s) of the chest.  The heart and mediastinal contours are normal. The lungs are clear. No pneumothorax or pleural effusion.      Impression:      No acute cardiopulmonary findings.    Signer Name: Donald Porras MD   Signed: 12/15/2021 5:12 AM   Workstation Name: [facility]    [facility]         Assessment and recommendation.    1. Atrial fibrillation with RVR.  New onset.  [id number] is 0.  He is currently anticoagulated on heparin, case if he needs electrical cardioversion.  Patient started developing pauses lasting 2 to 3 seconds on IV Cardizem so we have discontinued that.  His heart rate has been staying between 90s to 110s with no AV emily blocking agents 1 hour post stopping his IV Cardizem.  We will continue to refrain from starting him on AV emily blocking agents and watch for more pauses overnight.  Also I explained to him it is very unusual that he is having A. fib and also slow conduction disorder at his age so we want to rule out any underlying coronary artery disease, we will plan on doing a stress test in a.m., his troponins have been negative x2, he did have chest pain kind of anginal type when his heart rate was fast.  Depending upon his stress test results we will plan whether he will need electrical cardioversion plus beta-blockers post cardioversion or antiarrhythmic  medication.  I did review the results of the echocardiogram with him and his wife which showed preserved LV systolic function and no other significant functional valvular abnormalities.            Thank you very much for asking us to be involved in this patient's care.  We will follow along with you.    Neva An, APRN   12/15/21  12:46 EST

## 2021-12-15 NOTE — ED NOTES
Pt heart rate 140-161, ZARINA Alexis made aware with VORB to restart cardizem at 5mg/hr and follow protocol, and administer 1000ml normal saline fluid bolus due to BP 99/79.     Luz Marina Garcia RN  12/15/21 6474

## 2021-12-16 ENCOUNTER — APPOINTMENT (OUTPATIENT)
Dept: NUCLEAR MEDICINE | Facility: HOSPITAL | Age: 36
End: 2021-12-16

## 2021-12-16 ENCOUNTER — APPOINTMENT (OUTPATIENT)
Dept: CARDIOLOGY | Facility: HOSPITAL | Age: 36
End: 2021-12-16

## 2021-12-16 LAB
AMPHET+METHAMPHET UR QL: POSITIVE
AMPHETAMINES UR QL: POSITIVE
ANION GAP SERPL CALCULATED.3IONS-SCNC: 11.5 MMOL/L (ref 5–15)
APTT PPP: 54.7 SECONDS (ref 25.5–35.4)
APTT PPP: 55.6 SECONDS (ref 25.5–35.4)
APTT PPP: 62.5 SECONDS (ref 25.5–35.4)
APTT PPP: 65.4 SECONDS (ref 25.5–35.4)
BARBITURATES UR QL SCN: NEGATIVE
BENZODIAZ UR QL SCN: NEGATIVE
BH CV NUCLEAR PRIOR STUDY: 3
BH CV REST NUCLEAR ISOTOPE DOSE: 11.1 MCI
BH CV STRESS BP STAGE 1: NORMAL
BH CV STRESS BP STAGE 2: NORMAL
BH CV STRESS COMMENTS STAGE 1: NORMAL
BH CV STRESS COMMENTS STAGE 2: NORMAL
BH CV STRESS DOSE REGADENOSON STAGE 1: 0.4
BH CV STRESS DURATION MIN STAGE 1: 0
BH CV STRESS DURATION MIN STAGE 2: 4
BH CV STRESS DURATION SEC STAGE 1: 10
BH CV STRESS DURATION SEC STAGE 2: 0
BH CV STRESS HR STAGE 1: 121
BH CV STRESS HR STAGE 2: 119
BH CV STRESS NUCLEAR ISOTOPE DOSE: 31.5 MCI
BH CV STRESS PROTOCOL 1: NORMAL
BH CV STRESS RECOVERY BP: NORMAL MMHG
BH CV STRESS RECOVERY HR: 90 BPM
BH CV STRESS STAGE 1: 1
BH CV STRESS STAGE 2: 2
BUN SERPL-MCNC: 12 MG/DL (ref 6–20)
BUN/CREAT SERPL: 14.5 (ref 7–25)
BUPRENORPHINE SERPL-MCNC: NEGATIVE NG/ML
CALCIUM SPEC-SCNC: 8.5 MG/DL (ref 8.6–10.5)
CANNABINOIDS SERPL QL: NEGATIVE
CHLORIDE SERPL-SCNC: 111 MMOL/L (ref 98–107)
CO2 SERPL-SCNC: 20.5 MMOL/L (ref 22–29)
COCAINE UR QL: NEGATIVE
CREAT SERPL-MCNC: 0.83 MG/DL (ref 0.76–1.27)
DEPRECATED RDW RBC AUTO: 40.3 FL (ref 37–54)
ERYTHROCYTE [DISTWIDTH] IN BLOOD BY AUTOMATED COUNT: 12.2 % (ref 12.3–15.4)
GFR SERPL CREATININE-BSD FRML MDRD: 104 ML/MIN/1.73
GLUCOSE SERPL-MCNC: 114 MG/DL (ref 65–99)
HCT VFR BLD AUTO: 43.8 % (ref 37.5–51)
HGB BLD-MCNC: 15 G/DL (ref 13–17.7)
LV EF NUC BP: 62 %
MAGNESIUM SERPL-MCNC: 2.1 MG/DL (ref 1.6–2.6)
MAXIMAL PREDICTED HEART RATE: 183 BPM
MCH RBC QN AUTO: 30.8 PG (ref 26.6–33)
MCHC RBC AUTO-ENTMCNC: 34.2 G/DL (ref 31.5–35.7)
MCV RBC AUTO: 89.9 FL (ref 79–97)
METHADONE UR QL SCN: NEGATIVE
OPIATES UR QL: POSITIVE
OXYCODONE UR QL SCN: POSITIVE
PCP UR QL SCN: NEGATIVE
PERCENT MAX PREDICTED HR: 65.03 %
PHOSPHATE SERPL-MCNC: 2.7 MG/DL (ref 2.5–4.5)
PLATELET # BLD AUTO: 190 10*3/MM3 (ref 140–450)
PMV BLD AUTO: 10.3 FL (ref 6–12)
POTASSIUM SERPL-SCNC: 3.8 MMOL/L (ref 3.5–5.2)
PROPOXYPH UR QL: NEGATIVE
QT INTERVAL: 378 MS
QTC INTERVAL: 427 MS
RBC # BLD AUTO: 4.87 10*6/MM3 (ref 4.14–5.8)
SODIUM SERPL-SCNC: 143 MMOL/L (ref 136–145)
STRESS BASELINE BP: NORMAL MMHG
STRESS BASELINE HR: 85 BPM
STRESS PERCENT HR: 77 %
STRESS POST PEAK BP: NORMAL MMHG
STRESS POST PEAK HR: 119 BPM
STRESS TARGET HR: 156 BPM
TRICYCLICS UR QL SCN: NEGATIVE
WBC NRBC COR # BLD: 6.89 10*3/MM3 (ref 3.4–10.8)

## 2021-12-16 PROCEDURE — 93017 CV STRESS TEST TRACING ONLY: CPT

## 2021-12-16 PROCEDURE — 99232 SBSQ HOSP IP/OBS MODERATE 35: CPT | Performed by: INTERNAL MEDICINE

## 2021-12-16 PROCEDURE — 80048 BASIC METABOLIC PNL TOTAL CA: CPT | Performed by: PHYSICIAN ASSISTANT

## 2021-12-16 PROCEDURE — 83735 ASSAY OF MAGNESIUM: CPT | Performed by: PHYSICIAN ASSISTANT

## 2021-12-16 PROCEDURE — 25010000002 HEPARIN (PORCINE) PER 1000 UNITS: Performed by: INTERNAL MEDICINE

## 2021-12-16 PROCEDURE — 85027 COMPLETE CBC AUTOMATED: CPT | Performed by: PHYSICIAN ASSISTANT

## 2021-12-16 PROCEDURE — 78452 HT MUSCLE IMAGE SPECT MULT: CPT | Performed by: INTERNAL MEDICINE

## 2021-12-16 PROCEDURE — 80306 DRUG TEST PRSMV INSTRMNT: CPT | Performed by: PHYSICIAN ASSISTANT

## 2021-12-16 PROCEDURE — 85730 THROMBOPLASTIN TIME PARTIAL: CPT | Performed by: INTERNAL MEDICINE

## 2021-12-16 PROCEDURE — 84100 ASSAY OF PHOSPHORUS: CPT | Performed by: PHYSICIAN ASSISTANT

## 2021-12-16 PROCEDURE — 78452 HT MUSCLE IMAGE SPECT MULT: CPT

## 2021-12-16 PROCEDURE — 93018 CV STRESS TEST I&R ONLY: CPT | Performed by: INTERNAL MEDICINE

## 2021-12-16 PROCEDURE — 25010000002 REGADENOSON 0.4 MG/5ML SOLUTION: Performed by: INTERNAL MEDICINE

## 2021-12-16 PROCEDURE — 99232 SBSQ HOSP IP/OBS MODERATE 35: CPT | Performed by: PHYSICIAN ASSISTANT

## 2021-12-16 PROCEDURE — 0 TECHNETIUM SESTAMIBI: Performed by: INTERNAL MEDICINE

## 2021-12-16 PROCEDURE — A9500 TC99M SESTAMIBI: HCPCS | Performed by: INTERNAL MEDICINE

## 2021-12-16 RX ADMIN — SODIUM CHLORIDE, PRESERVATIVE FREE 10 ML: 5 INJECTION INTRAVENOUS at 08:35

## 2021-12-16 RX ADMIN — HEPARIN SODIUM 15.62 UNITS/KG/HR: 5000 INJECTION INTRAVENOUS; SUBCUTANEOUS at 05:28

## 2021-12-16 RX ADMIN — HEPARIN SODIUM 2500 UNITS: 5000 INJECTION INTRAVENOUS; SUBCUTANEOUS at 02:21

## 2021-12-16 RX ADMIN — HEPARIN SODIUM 15.62 UNITS/KG/HR: 5000 INJECTION INTRAVENOUS; SUBCUTANEOUS at 23:19

## 2021-12-16 RX ADMIN — REGADENOSON 0.4 MG: 0.08 INJECTION, SOLUTION INTRAVENOUS at 12:52

## 2021-12-16 RX ADMIN — METOPROLOL TARTRATE 12.5 MG: 25 TABLET, FILM COATED ORAL at 18:23

## 2021-12-16 RX ADMIN — TECHNETIUM TC 99M SESTAMIBI 1 DOSE: 1 INJECTION INTRAVENOUS at 10:30

## 2021-12-16 RX ADMIN — TECHNETIUM TC 99M SESTAMIBI 1 DOSE: 1 INJECTION INTRAVENOUS at 12:52

## 2021-12-16 NOTE — PLAN OF CARE
Goal Outcome Evaluation:  Plan of Care Reviewed With: patient           Pt resting in bed. Pt voices no complaints or concerns at this time. No s/s of acute distress noted. Will continue to follow plan of care.

## 2021-12-16 NOTE — PROGRESS NOTES
[facility]  PROGRESS NOTE     Patient Identification:  Name:  Donald Reno  Age:  36 y.o.  Sex:  male  :  1985  MRN:  [id number]  Visit Number:  [id number]  Primary Care Provider:  Provider, No Known    Length of stay:  1    ----------------------------------------------------------------------------------------------------------------------  Subjective     Chief Complaint:  Follow up for new onset atrial fibrillation with rapid ventricular response    History of Presenting Illness/Hospital Course:  Patient is a 36-year-old male with past medical history significant for obesity and ongoing tobacco abuse that presented to the [facility] emergency department on 12/15/2021 for evaluation of chest pain and palpitations.  Please see admitting history and physical for further and complete details.  Patient was found to have new onset atrial fibrillation with rapid ventricular response.  Patient was admitted to the telemetry floor for further evaluation and treatment.  Patient was started on a heparin drip for anticoagulation, [id number] score of approximately 0.  Patient also administered bolus of Cardizem in ED and continued on a Cardizem drip on admission.  Patient was noted to have frequent ventricular pauses, thus Cardizem drip was titrated down and later discontinued.  Later in the evening of admission, patient did convert to sinus rhythm.  Cardiology did evaluate patient, planning on stress test to be performed today.  Transthoracic echocardiogram was obtained, no cardiac thrombus noted, preserved EF.    Subjective:  Today, the patient was sitting up in bed per my evaluation this morning, no acute distress noted.  Family member at bedside.  Patient n.p.o. pending stress test today.  Patient has no complaints today.  He denies any further chest pain or chest discomfort.  No palpitations.  He denies any dyspnea.  No fevers or  chills.  No abdominal pain, nausea, vomiting or change vomits.  No urinary complaints.  No headache or dizziness.    Present during exam: Family member  ----------------------------------------------------------------------------------------------------------------------  Objective     Consults:  • Cardiology    Procedures:  • 12/15/2021: Transthoracic echocardiogram  o     Current Hospital Meds:  metoprolol tartrate, 12.5 mg, Oral, Q12H  nicotine, 1 patch, Transdermal, Q24H  pantoprazole, 40 mg, Oral, Q AM  sodium chloride, 10 mL, Intravenous, Q12H      dilTIAZem, 5 mg/hr, Last Rate: Stopped (12/15/21 1652)  heparin (porcine), 9.62 Units/kg/hr, Last Rate: 15.62 Units/kg/hr (12/16/21 0528)      ----------------------------------------------------------------------------------------------------------------------  Vital Signs:  Temp:  [97.7 °F (36.5 °C)-98.2 °F (36.8 °C)] 97.9 °F (36.6 °C)  Heart Rate:  [75-84] 84  Resp:  [18-20] 18  BP: (109-128)/(63-85) 122/67  Mean Arterial Pressure (Non-Invasive) for the past 24 hrs (Last 3 readings):   Noninvasive MAP (mmHg)   12/16/21 1454 89   12/16/21 0959 91   12/16/21 0606 100     SpO2 Percentage    12/16/21 0606 12/16/21 0959 12/16/21 1454   SpO2: 97% 90% 96%     SpO2:  [90 %-98 %] 96 %  on   ;   Device (Oxygen Therapy): room air    Body mass index is 31.17 kg/m².  Wt Readings from Last 3 Encounters:   12/16/21 104 kg (229 lb 12.8 oz)        Intake/Output Summary (Last 24 hours) at 12/16/2021 1642  Last data filed at 12/16/2021 1454  Gross per 24 hour   Intake 480 ml   Output 1425 ml   Net -945 ml     Diet Regular; Cardiac  ----------------------------------------------------------------------------------------------------------------------  Physical exam:  Physical Exam  Nursing note reviewed.   Constitutional:       General: He is awake. He is not in acute distress.     Appearance: He is well-developed. He is not toxic-appearing.      Comments: Sitting up in bed, no acute  distress noted.  Family member present bedside.  On room air.   HENT:      Head: Normocephalic and atraumatic.      Mouth/Throat:      Mouth: Mucous membranes are moist.   Eyes:      Conjunctiva/sclera: Conjunctivae normal.      Pupils: Pupils are equal, round, and reactive to light.   Cardiovascular:      Rate and Rhythm: Normal rate and regular rhythm.      Pulses:           Dorsalis pedis pulses are 2+ on the right side and 2+ on the left side.      Heart sounds: Normal heart sounds. No murmur heard.  No friction rub. No gallop.    Pulmonary:      Effort: Pulmonary effort is normal.      Breath sounds: Normal breath sounds and air entry. No wheezing, rhonchi or rales.      Comments: On room air, speaks in full sentences without dyspnea.  Abdominal:      General: Bowel sounds are normal. There is no distension.      Palpations: Abdomen is soft.      Tenderness: There is no abdominal tenderness. There is no guarding or rebound.   Genitourinary:     Comments: No bay catheter in place.  Musculoskeletal:      Cervical back: Neck supple.      Right lower leg: No edema.      Left lower leg: No edema.   Skin:     General: Skin is warm and dry.      Capillary Refill: Capillary refill takes less than 2 seconds.   Neurological:      General: No focal deficit present.      Mental Status: He is alert and oriented to person, place, and time.      GCS: GCS eye subscore is 4. GCS verbal subscore is 5. GCS motor subscore is 6.      Cranial Nerves: Cranial nerves are intact.      Sensory: Sensation is intact.      Motor: Motor function is intact.      Comments: Awake and alert. Follows commands. Answers questions appropriately. Moves all extremities equally. Strength and sensation intact. No focal neuro deficit on exam.   Psychiatric:         Attention and Perception: Attention normal.         Mood and Affect: Mood and affect normal.         Speech: Speech normal.         Behavior: Behavior is cooperative.         ----------------------------------------------------------------------------------------------------------------------  Tele:    Sinus 80s    I have personally reviewed the EKG/Telemetry strips   ----------------------------------------------------------------------------------------------------------------------  Results from last 7 days   Lab Units 12/15/21  1923 12/15/21  1434 12/15/21  0547   TROPONIN T ng/mL <0.010 <0.010 <0.010           Results from last 7 days   Lab Units 12/16/21  0044 12/15/21  1257 12/15/21  0343   WBC 10*3/mm3 6.89 7.25 9.14   HEMOGLOBIN g/dL 15.0 15.0 16.9   HEMATOCRIT % 43.8 44.2 48.8   MCV fL 89.9 90.2 88.6   MCHC g/dL 34.2 33.9 34.6   PLATELETS 10*3/mm3 190 220 229   INR   --  1.02  --      Results from last 7 days   Lab Units 12/16/21  0043 12/15/21  0547 12/15/21  0344   SODIUM mmol/L 143  --  142   POTASSIUM mmol/L 3.8  --  3.7   MAGNESIUM mg/dL 2.1 2.1  --    CHLORIDE mmol/L 111*  --  105   CO2 mmol/L 20.5*  --  22.8   BUN mg/dL 12  --  13   CREATININE mg/dL 0.83  --  1.10   EGFR IF NONAFRICN AM mL/min/1.73 104  --  75   CALCIUM mg/dL 8.5*  --  9.7   GLUCOSE mg/dL 114*  --  103*   ALBUMIN g/dL  --   --  4.93   BILIRUBIN mg/dL  --   --  0.7   ALK PHOS U/L  --   --  123*   AST (SGOT) U/L  --   --  22   ALT (SGPT) U/L  --   --  35   Estimated Creatinine Clearance: 152 mL/min (by C-G formula based on SCr of 0.83 mg/dL).    Lab Results   Component Value Date    HGBA1C 5.50 12/15/2021     Lab Results   Component Value Date    TSH 0.701 12/15/2021    FREET4 1.50 12/15/2021     Microbiology Results (last 10 days)     Procedure Component Value - Date/Time    COVID-19 and FLU A/B PCR - Swab, Nasopharynx [[id number]]  (Normal) Collected: 12/15/21 0634    Lab Status: Final result Specimen: Swab from Nasopharynx Updated: 12/15/21 0720     COVID19 Not Detected     Influenza A PCR Not Detected     Influenza B PCR Not Detected    Narrative:      Fact sheet for providers:  https://www.fda.gov/media/874106/download    Fact sheet for patients: https://www.fda.gov/media/358899/download    Test performed by PCR.         Pain Management Panel     Pain Management Panel Latest Ref Rng & Units 12/16/2021    AMPHETAMINES SCREEN, URINE Negative Positive(A)    BARBITURATES SCREEN Negative Negative    BENZODIAZEPINE SCREEN, URINE Negative Negative    BUPRENORPHINEUR Negative Negative    COCAINE SCREEN, URINE Negative Negative    METHADONE SCREEN, URINE Negative Negative    METHAMPHETAMINEUR Negative Positive(A)        I have personally reviewed the above laboratory results.   ----------------------------------------------------------------------------------------------------------------------  Imaging Results (Last 24 Hours)     ** No results found for the last 24 hours. **        I have personally reviewed the above radiology results.   ----------------------------------------------------------------------------------------------------------------------  Assessment/Plan     -New onset atrial fibrillation with rapid ventricular response   -Chest pain, atypical features  -Ventricular pauses  · Patient previously on Cardizem drip, discontinued yesterday as patient had converted to sinus rhythm.  Patient also was experiencing ventricular pauses with Cardizem drip.  As such, AV emily blockade is being avoided as long as patient's heart rate is controlled.  · Transthoracic echocardiogram reviewed with preserved EF, no cardiac thrombus  · Patient's heart rate remains controlled, remains in sinus rhythm  · Troponin T remain negative  · [id number] score approximately 0, heparin drip on board for anticoagulation in case patient requires electrical cardioversion or any other procedures.  · TSH within normal limits.  Electrolytes stable.  · Patient did report previously on significant caffeine intake.  UDS obtained positive for methamphetamines that could be contributing.  · Cardiology previously  evaluated the patient, planning for stress test today.  Patient remains NPO.  · Appreciate cardiology input/assistance.  · Continue telemetry monitoring     -UDS positive for methamphetamines  · Could be contributing to patient's atrial fibrillation  · Patient denied drug use on admission  · Encourage cessation    -Mild hyperglycemia without hx of diabetes mellitus  · Hemoglobin A1c 5.5, no other work-up necessary     -Obesity by BMI, Body mass index is 31.1 kg/m².  · Affecting all aspects of care     -Tobacco abuse   · Encourage cessation   · NRT available     --------------------------------------------------  DVT Prophylaxis: Heparin drip  GI Prophylaxis: PPI  FEN: No IV fluids. Replace electrolytes per protocol as necessary. NPO.   Activity: Up with assistance as tolerated  --------------------------------------------------  Disposition:  • Plans on discharge home once medically stable, pending stress test and cardiac evaluation  --------------------------------------------------    The patient is high risk due to the following diagnoses/reasons: New onset atrial fibrillation with rapid ventricular response, ventricular pauses, tobacco abuse, obesity, UDS positive for methamphetamines    I have discussed the patient's assessment and plan with the patient, family bedside, nursing staff, and attending physician Sabiha Bailon DO Allyson O'Kuma, PA-C  Hospitalist Service -- [facility]   Pager: [phone]    12/16/21  16:42 EST    Attending Physician: Sabiha Freeman DO    ----------------------------------------------------------------------------------------------------------------------

## 2021-12-16 NOTE — PROGRESS NOTES
Patient Identification:  Name:  Donald Reno  Age:  36 y.o.  Sex:  male  :  1985  MRN:  [id number]  Visit Number:  [id number]    Chief Complaint:   F/u Afib    Subjective:    Pt has converted to NSR  He is asymptomatic otherwise  Had stress test today which showed inferior wall perfusion defect concerning for mild ischemia vs attenuation artifact.   ----------------------------------------------------------------------------------------------------------------------  Current Hospital Meds:  metoprolol tartrate, 12.5 mg, Oral, Q12H  nicotine, 1 patch, Transdermal, Q24H  pantoprazole, 40 mg, Oral, Q AM  sodium chloride, 10 mL, Intravenous, Q12H      heparin (porcine), 9.62 Units/kg/hr, Last Rate: 15.62 Units/kg/hr (21 0528)      ----------------------------------------------------------------------------------------------------------------------  Vital Signs:  Temp:  [97.7 °F (36.5 °C)-98.2 °F (36.8 °C)] 97.9 °F (36.6 °C)  Heart Rate:  [75-84] 84  Resp:  [18-20] 18  BP: (109-128)/(63-85) 122/67      12/15/21  0311 12/15/21  1358 21  0709   Weight: 104 kg (230 lb) 104 kg (229 lb 4.8 oz) 104 kg (229 lb 12.8 oz) (admit weight, pt not on floor 24 hours)     Body mass index is 31.17 kg/m².    Intake/Output Summary (Last 24 hours) at 2021  Last data filed at 2021 1454  Gross per 24 hour   Intake 480 ml   Output 1425 ml   Net -945 ml     Diet Regular; Cardiac  ----------------------------------------------------------------------------------------------------------------------  Physical exam:  Constitutional:    HENT:  Head:  Normocephalic and atraumatic.    Eyes:  Conjunctivae and EOM are normal.  Pupils are equal, round, and reactive to light.  No scleral icterus.    Neck:  Neck supple.  No JVD present.    Cardiovascular: Normal rate, regular rhythm, S1 S2+, NO S3 / S4  Pulmonary/Chest:  Vesicular breath sounds B/L  Abdominal:  Soft.  Bowel sounds are normal.  No distension and no  tenderness.      Neurological:  Alert and oriented to person, place, and time. No focal defecits  Skin:  Skin is warm and dry. No rash noted. No pallor.   Musculoskeletal:  No edema, no tenderness, and no deformity.  No red or swollen joints anywhere.   Peripheral vascular:  2+ Pulses B/L DP  ----------------------------------------------------------------------------------------------------------------------    ----------------------------------------------------------------------------------------------------------------------  Results from last 7 days   Lab Units 12/15/21  1923 12/15/21  1434 12/15/21  0547   TROPONIN T ng/mL <0.010 <0.010 <0.010     Results from last 7 days   Lab Units 12/16/21  0044 12/15/21  1257 12/15/21  0343   WBC 10*3/mm3 6.89 7.25 9.14   HEMOGLOBIN g/dL 15.0 15.0 16.9   HEMATOCRIT % 43.8 44.2 48.8   MCV fL 89.9 90.2 88.6   MCHC g/dL 34.2 33.9 34.6   PLATELETS 10*3/mm3 190 220 229   INR   --  1.02  --          Results from last 7 days   Lab Units 12/16/21 0043 12/15/21  0547 12/15/21  0344   SODIUM mmol/L 143  --  142   POTASSIUM mmol/L 3.8  --  3.7   MAGNESIUM mg/dL 2.1 2.1  --    CHLORIDE mmol/L 111*  --  105   CO2 mmol/L 20.5*  --  22.8   BUN mg/dL 12  --  13   CREATININE mg/dL 0.83  --  1.10   EGFR IF NONAFRICN AM mL/min/1.73 104  --  75   CALCIUM mg/dL 8.5*  --  9.7   GLUCOSE mg/dL 114*  --  103*   ALBUMIN g/dL  --   --  4.93   BILIRUBIN mg/dL  --   --  0.7   ALK PHOS U/L  --   --  123*   AST (SGOT) U/L  --   --  22   ALT (SGPT) U/L  --   --  35   Estimated Creatinine Clearance: 152 mL/min (by C-G formula based on SCr of 0.83 mg/dL).    No results found for: AMMONIA      No results found for: BLOODCX  No results found for: URINECX  No results found for: WOUNDCX  No results found for: STOOLCX    I have personally looked at the labs and they are summarized  above.  ----------------------------------------------------------------------------------------------------------------------  Imaging Results (Last 24 Hours)     ** No results found for the last 24 hours. **        ----------------------------------------------------------------------------------------------------------------------    Assessment:  Ps Afib   Abnormal nuclear stress test       Plan:  Had a lengthy discussion with pt and his wife regarding his abnormal stress test and given the fact that he had anginal pain during his tachycardia and has significant family hx of premature CAD we will go ahead with further invasive evaluation in am   It pt has no significant CAD then likely will start him on Flecainide and will not need OAC given chandsvasc is 0.   I have explained the risks associated with the procedure to the patient including but not limited to an allergic reaction to the contrast material or medications used during the procedure bleeding, infection, and bruising at the catheter insertion site blood clots, which may trigger heart attack, stroke,   damage to the artery where the catheter was inserted, or damage to the arteries as the catheter travels through your body, irregular heart rhythm arrhythmias, kidney damage caused by the contrast material.                Pawan Quiñones MD, [facility]  Interventional Cardiology        12/16/21  18:11 EST

## 2021-12-16 NOTE — PLAN OF CARE
Goal Outcome Evaluation:  Plan of Care Reviewed With: patient           Outcome Summary: Pt resting in bed this shift with no complaints. Heparin gtt currently infusing @15.62 units/kg. Redraw in for 0820. VSS. No s/s of acute distress noted. Will cont to follow POC.

## 2021-12-17 VITALS
HEART RATE: 66 BPM | HEIGHT: 72 IN | OXYGEN SATURATION: 98 % | SYSTOLIC BLOOD PRESSURE: 111 MMHG | DIASTOLIC BLOOD PRESSURE: 73 MMHG | RESPIRATION RATE: 18 BRPM | WEIGHT: 231.4 LBS | BODY MASS INDEX: 31.34 KG/M2 | TEMPERATURE: 98.1 F

## 2021-12-17 LAB
ANION GAP SERPL CALCULATED.3IONS-SCNC: 11.2 MMOL/L (ref 5–15)
APTT PPP: 50.6 SECONDS (ref 25.5–35.4)
BUN SERPL-MCNC: 10 MG/DL (ref 6–20)
BUN/CREAT SERPL: 11.5 (ref 7–25)
CALCIUM SPEC-SCNC: 8.7 MG/DL (ref 8.6–10.5)
CHLORIDE SERPL-SCNC: 107 MMOL/L (ref 98–107)
CO2 SERPL-SCNC: 20.8 MMOL/L (ref 22–29)
CREAT SERPL-MCNC: 0.87 MG/DL (ref 0.76–1.27)
DEPRECATED RDW RBC AUTO: 40.8 FL (ref 37–54)
ERYTHROCYTE [DISTWIDTH] IN BLOOD BY AUTOMATED COUNT: 12.4 % (ref 12.3–15.4)
GFR SERPL CREATININE-BSD FRML MDRD: 99 ML/MIN/1.73
GLUCOSE SERPL-MCNC: 112 MG/DL (ref 65–99)
HCT VFR BLD AUTO: 45.4 % (ref 37.5–51)
HGB BLD-MCNC: 15.4 G/DL (ref 13–17.7)
MAGNESIUM SERPL-MCNC: 2.1 MG/DL (ref 1.6–2.6)
MCH RBC QN AUTO: 30.6 PG (ref 26.6–33)
MCHC RBC AUTO-ENTMCNC: 33.9 G/DL (ref 31.5–35.7)
MCV RBC AUTO: 90.1 FL (ref 79–97)
PLATELET # BLD AUTO: 231 10*3/MM3 (ref 140–450)
PMV BLD AUTO: 11.1 FL (ref 6–12)
POTASSIUM SERPL-SCNC: 3.8 MMOL/L (ref 3.5–5.2)
RBC # BLD AUTO: 5.04 10*6/MM3 (ref 4.14–5.8)
SODIUM SERPL-SCNC: 139 MMOL/L (ref 136–145)
WBC NRBC COR # BLD: 6.78 10*3/MM3 (ref 3.4–10.8)

## 2021-12-17 PROCEDURE — 4A023N7 MEASUREMENT OF CARDIAC SAMPLING AND PRESSURE, LEFT HEART, PERCUTANEOUS APPROACH: ICD-10-PCS | Performed by: INTERNAL MEDICINE

## 2021-12-17 PROCEDURE — 85027 COMPLETE CBC AUTOMATED: CPT | Performed by: PHYSICIAN ASSISTANT

## 2021-12-17 PROCEDURE — 25010000002 MIDAZOLAM PER 1 MG: Performed by: INTERNAL MEDICINE

## 2021-12-17 PROCEDURE — B2111ZZ FLUOROSCOPY OF MULTIPLE CORONARY ARTERIES USING LOW OSMOLAR CONTRAST: ICD-10-PCS | Performed by: INTERNAL MEDICINE

## 2021-12-17 PROCEDURE — 85730 THROMBOPLASTIN TIME PARTIAL: CPT | Performed by: INTERNAL MEDICINE

## 2021-12-17 PROCEDURE — 0 IOPAMIDOL PER 1 ML: Performed by: INTERNAL MEDICINE

## 2021-12-17 PROCEDURE — 99239 HOSP IP/OBS DSCHRG MGMT >30: CPT | Performed by: PHYSICIAN ASSISTANT

## 2021-12-17 PROCEDURE — C1769 GUIDE WIRE: HCPCS | Performed by: INTERNAL MEDICINE

## 2021-12-17 PROCEDURE — 99152 MOD SED SAME PHYS/QHP 5/>YRS: CPT | Performed by: INTERNAL MEDICINE

## 2021-12-17 PROCEDURE — C1894 INTRO/SHEATH, NON-LASER: HCPCS | Performed by: INTERNAL MEDICINE

## 2021-12-17 PROCEDURE — 25010000002 FENTANYL CITRATE (PF) 50 MCG/ML SOLUTION: Performed by: INTERNAL MEDICINE

## 2021-12-17 PROCEDURE — 25010000002 HEPARIN (PORCINE) PER 1000 UNITS: Performed by: INTERNAL MEDICINE

## 2021-12-17 PROCEDURE — 93458 L HRT ARTERY/VENTRICLE ANGIO: CPT | Performed by: INTERNAL MEDICINE

## 2021-12-17 PROCEDURE — 80048 BASIC METABOLIC PNL TOTAL CA: CPT | Performed by: PHYSICIAN ASSISTANT

## 2021-12-17 PROCEDURE — 83735 ASSAY OF MAGNESIUM: CPT | Performed by: PHYSICIAN ASSISTANT

## 2021-12-17 PROCEDURE — B2151ZZ FLUOROSCOPY OF LEFT HEART USING LOW OSMOLAR CONTRAST: ICD-10-PCS | Performed by: INTERNAL MEDICINE

## 2021-12-17 RX ORDER — SODIUM CHLORIDE 9 MG/ML
INJECTION, SOLUTION INTRAVENOUS CONTINUOUS PRN
Status: COMPLETED | OUTPATIENT
Start: 2021-12-17 | End: 2021-12-17

## 2021-12-17 RX ORDER — HEPARIN SODIUM 1000 [USP'U]/ML
INJECTION, SOLUTION INTRAVENOUS; SUBCUTANEOUS AS NEEDED
Status: DISCONTINUED | OUTPATIENT
Start: 2021-12-17 | End: 2021-12-17 | Stop reason: HOSPADM

## 2021-12-17 RX ORDER — FENTANYL CITRATE 50 UG/ML
INJECTION, SOLUTION INTRAMUSCULAR; INTRAVENOUS AS NEEDED
Status: DISCONTINUED | OUTPATIENT
Start: 2021-12-17 | End: 2021-12-17 | Stop reason: HOSPADM

## 2021-12-17 RX ORDER — SODIUM CHLORIDE 9 MG/ML
100 INJECTION, SOLUTION INTRAVENOUS CONTINUOUS
Status: DISCONTINUED | OUTPATIENT
Start: 2021-12-17 | End: 2021-12-17 | Stop reason: HOSPADM

## 2021-12-17 RX ORDER — LIDOCAINE HYDROCHLORIDE 20 MG/ML
INJECTION, SOLUTION INFILTRATION; PERINEURAL AS NEEDED
Status: DISCONTINUED | OUTPATIENT
Start: 2021-12-17 | End: 2021-12-17 | Stop reason: HOSPADM

## 2021-12-17 RX ORDER — MIDAZOLAM HYDROCHLORIDE 1 MG/ML
INJECTION INTRAMUSCULAR; INTRAVENOUS AS NEEDED
Status: DISCONTINUED | OUTPATIENT
Start: 2021-12-17 | End: 2021-12-17 | Stop reason: HOSPADM

## 2021-12-17 RX ADMIN — SODIUM CHLORIDE, PRESERVATIVE FREE 10 ML: 5 INJECTION INTRAVENOUS at 09:21

## 2021-12-17 RX ADMIN — METOPROLOL TARTRATE 12.5 MG: 25 TABLET, FILM COATED ORAL at 09:23

## 2021-12-17 RX ADMIN — HEPARIN SODIUM 2500 UNITS: 5000 INJECTION INTRAVENOUS; SUBCUTANEOUS at 06:17

## 2021-12-17 RX ADMIN — SODIUM CHLORIDE 100 ML/HR: 9 INJECTION, SOLUTION INTRAVENOUS at 09:20

## 2021-12-17 RX ADMIN — NICOTINE 1 PATCH: 14 PATCH, EXTENDED RELEASE TRANSDERMAL at 09:22

## 2021-12-17 NOTE — PAYOR COMM NOTE
"[facility]  NPI:[id number]    Utilization Review  Contact: Brooke Garrido RN  Phone: [phone]  Fax:[phone]    DISCHARGE NOTIFICATION   [id number]    Renee Merritt (36 y.o. Male)             Date of Birth Social Security Number Address Home Phone MRN    1985  [address]   [facility] [phone] [id number]    Yazdanism Marital Status             None        Admission Date Admission Type Admitting Provider Attending Provider Department, Room/Bed    12/15/21 Emergency Sabiha Freeman DO  [facility] 3 [facility], 3302/2S    Discharge Date Discharge Disposition Discharge Destination          12/17/2021 Home or Self Care              Attending Provider: (none)   Allergies: Cherry, Grapefruit    Isolation: None   Infection: None   Code Status: CPR   Advance Care Planning Activity    Ht: 182.9 cm (72\")   Wt: 105 kg (231 lb 6.4 oz)    Admission Cmt: None   Principal Problem: Atrial fibrillation with RVR (HCC) [I48.91]                 Active Insurance as of 12/15/2021     Primary Coverage     Payor Plan Insurance Group Employer/Plan Group    [facility] [id number]     Payor Plan Address Payor Plan Phone Number Payor Plan Fax Number Effective Dates    [address]   1/1/2021 - None Entered    [facility] [id number]       Subscriber Name Subscriber Birth Date Member ID       RENEE MERRITT 1985 [id number]                 Emergency Contacts      (Rel.) Home Phone Work Phone Mobile Phone    RENÉIDANIAY (Spouse) [phone] -- --                  "

## 2021-12-17 NOTE — PLAN OF CARE
Goal Outcome Evaluation:  Plan of Care Reviewed With: patient           Outcome Summary: Pt resting in bed with no complaints. VSS. No s/s of acute distress noted. Will cont to follow POC.

## 2021-12-17 NOTE — DISCHARGE INSTR - APPOINTMENTS
Pt  has  an zaria ruiz  for  dec  22  at  2:15   dr norman  office    closed  at  12  noon  will  call  and  get  atp  and call pt

## 2021-12-17 NOTE — DISCHARGE SUMMARY
[facility] Medicine Services  DISCHARGE SUMMARY    Patient Identification:  Name:  Donald Reno  Age:  36 y.o.  Sex:  male  :  1985  MRN:  [id number]  Visit Number:  [id number]    Date of Admission: 12/15/2021  Date of Discharge: 2021    PCP: Provider, No Known    Discharging Provider: Ivanna Santos PA-C / Dr. Sabiha Freeman DO     Admission/Discharge Diagnoses     Discharge Diagnoses:  · New onset atrial fibrillation with rapid ventricular response now converted to sinus   · Chest pain, atypical features, no evidence of coronary disease on [facility]   · Methamphetamine abuse   · Obesity by BMI, 31.38  · Ongoing tobacco abuse     Consults/Procedures     Consults:   • Cardiology -- Dr. Quiñones     Procedures Performed:  • 12/15/2021: Transthoracic echocardiogram   o   • 2021: Stress test with myocardial perfusion imaging   o   • 2021: Left heart catheterization -- Dr. Quiñones, interventional cardiology   o Coronary anatomy findings:  - LM: Is a large calibre vessel , normal take off from left cusp, divides into LAD and Lcx.  No significant stenosis  - LAD: No significant stenosis in the LAD  - Ramus: Moderate caliber vessel with no significant stenosis  - LCX: Moderate calibre vessel, no significant stenosis  - RCA: Large calibre, dominant artery, normal take off from right cusp.  - No significant stenosis.   - Left Ventriculography:  • LV systolic function was  with visual estimated EF of 60. No wall motion abnormalities.  • No significant mitral regurgitation noted.   • LVEDP: 10 mmH  • No gradient across the aortic valve on pull back.   · Final Impression:   · Normal epicardial coronary arteries  · Normal LV systolic and diastolic function.  · Recommendations:  · Continue with low-dose of beta-blocker for his paroxysmal A. fib which is likely secondary to cardiac stimulants mediated.  I did  patient to refrain from using any cardiac stimulant drugs in  the future and he verbalized understanding.     History of Presenting Illness     Donald Reno is a 36 y.o. male with past medical history significant for obesity and ongoing tobacco abuse that presented to the [facility] emergency department on 12/15/2021 for evaluation of chest pain and palpitations.  Please see admitting history and physical for further and complete details.  Patient was found to have new onset atrial fibrillation with rapid ventricular response.    Hospital Course     Patient was admitted to the telemetry floor for further evaluation and treatment. Patient was started on a heparin drip for anticoagulation, [id number] score of approximately 0.  Patient also administered bolus of Cardizem in ED and continued on a Cardizem drip on admission.  Patient was noted to have frequent ventricular pauses, thus Cardizem drip was titrated down and later discontinued.  Later in the evening of admission, patient did convert to sinus rhythm.Transthoracic echocardiogram was obtained, no cardiac thrombus noted, preserved EF. Cardiology was consulted and did evaluate the patient. Stress test performed which resulted as abnormal with medium sized, mildly severe area of ischemia located in the inferior wall, likely GI artifact. However, due to presenting illness and family history of premature CAD, cardiology elected to proceed with left heart catheterization. Patient tolerated procedure well with no acute complications. LHC revealed normal coronary arteries. It is suspected that patient's chest discomfort was secondary to atrial fibrillation, and onset of atrial fibrillation likely due to methamphetamine abuse which was noted on UDS. Patient educated on methamphetamine abuse, cessation strongly encouraged. Per cardiology recommendations, patient continued on PO Metoprolol for rate control. Patient not requiring anticoagulation as he converted and remains with low [id number] score of 0.     On day of  discharge, it was felt that he had reached maximal inpatient benefit and was stable for discharge. TR band deflated per protocol without evidence of hematoma. Patient ambulated in room without onset of chest pain or palpitations. Patient remained in sinus rhythm, rate controlled. Vitals stable. Patient will be scheduled to follow up with cardiology in 4 weeks post cath. Patient will also be set up with PCP to be seen in the next week post hospital discharge. Patient educated on signs/symptoms warranting emergent return to care. Please see discharge MAR below for complete list of discharge medications.     Discharge Vitals/Physical Examination     Vital Signs:  Temp:  [97.9 °F (36.6 °C)-98.2 °F (36.8 °C)] 98.1 °F (36.7 °C)  Heart Rate:  [60-73] 66  Resp:  [18] 18  BP: (111-120)/(73-79) 111/73  Mean Arterial Pressure (Non-Invasive) for the past 24 hrs (Last 3 readings):   Noninvasive MAP (mmHg)   12/17/21 0930 87   12/17/21 0624 89   12/17/21 0300 93     SpO2 Percentage    12/17/21 0300 12/17/21 0624 12/17/21 0930   SpO2: 98% 97% 98%     SpO2:  [97 %-98 %] 98 %  on  Flow (L/min):  [2] 2;   Device (Oxygen Therapy): room air    Body mass index is 31.38 kg/m².  Wt Readings from Last 3 Encounters:   12/17/21 105 kg (231 lb 6.4 oz)       Physical Exam:  Physical Exam  Nursing note reviewed.   Constitutional:       General: He is awake. He is not in acute distress.     Appearance: He is well-developed. He is obese. He is not toxic-appearing.      Comments: Pleasant, no acute distress noted. No family present at bedside. On room air.    HENT:      Head: Normocephalic and atraumatic.      Mouth/Throat:      Mouth: Mucous membranes are moist.   Eyes:      Conjunctiva/sclera: Conjunctivae normal.      Pupils: Pupils are equal, round, and reactive to light.   Cardiovascular:      Rate and Rhythm: Normal rate and regular rhythm.      Pulses:           Dorsalis pedis pulses are 2+ on the right side and 2+ on the left side.       Heart sounds: Normal heart sounds. No murmur heard.  No friction rub. No gallop.       Comments: Right radial wrist with TR band, no hematoma. RN deflating per protocol. Pt to be d/c after TR band deflated completely.   Pulmonary:      Effort: Pulmonary effort is normal.      Breath sounds: Normal breath sounds and air entry. No wheezing, rhonchi or rales.      Comments: On room air, speaks in full sentences without dyspnea.   Abdominal:      General: Bowel sounds are normal. There is no distension.      Palpations: Abdomen is soft.      Tenderness: There is no abdominal tenderness. There is no guarding or rebound.   Genitourinary:     Comments: No bay catheter in place.  Musculoskeletal:      Cervical back: Neck supple.      Right lower leg: No edema.      Left lower leg: No edema.   Skin:     General: Skin is warm and dry.      Capillary Refill: Capillary refill takes less than 2 seconds.   Neurological:      General: No focal deficit present.      Mental Status: He is alert and oriented to person, place, and time.      GCS: GCS eye subscore is 4. GCS verbal subscore is 5. GCS motor subscore is 6.      Cranial Nerves: Cranial nerves are intact.      Sensory: Sensation is intact.      Motor: Motor function is intact.      Comments: Awake and alert. Follows commands. Answers questions appropriately. Moves all extremities equally. Strength and sensation intact. No focal neuro deficit on exam.   Psychiatric:         Attention and Perception: Attention normal.         Mood and Affect: Mood and affect normal.         Speech: Speech normal.         Behavior: Behavior is cooperative.       Pertinent Laboratory/Radiology Results     Pertinent Laboratory Results:  Results from last 7 days   Lab Units 12/15/21  1923 12/15/21  1434 12/15/21  0547   TROPONIN T ng/mL <0.010 <0.010 <0.010           Results from last 7 days   Lab Units 12/17/21  0438 12/16/21  0044 12/15/21  1257   WBC 10*3/mm3 6.78 6.89 7.25   HEMOGLOBIN g/dL  15.4 15.0 15.0   HEMATOCRIT % 45.4 43.8 44.2   MCV fL 90.1 89.9 90.2   MCHC g/dL 33.9 34.2 33.9   PLATELETS 10*3/mm3 231 190 220   INR   --   --  1.02     Results from last 7 days   Lab Units 12/17/21  0438 12/16/21  0043 12/15/21  0547 12/15/21  0344   SODIUM mmol/L 139 143  --  142   POTASSIUM mmol/L 3.8 3.8  --  3.7   MAGNESIUM mg/dL 2.1 2.1 2.1  --    CHLORIDE mmol/L 107 111*  --  105   CO2 mmol/L 20.8* 20.5*  --  22.8   BUN mg/dL 10 12  --  13   CREATININE mg/dL 0.87 0.83  --  1.10   EGFR IF NONAFRICN AM mL/min/1.73 99 104  --  75   CALCIUM mg/dL 8.7 8.5*  --  9.7   GLUCOSE mg/dL 112* 114*  --  103*   ALBUMIN g/dL  --   --   --  4.93   BILIRUBIN mg/dL  --   --   --  0.7   ALK PHOS U/L  --   --   --  123*   AST (SGOT) U/L  --   --   --  22   ALT (SGPT) U/L  --   --   --  35   Estimated Creatinine Clearance: 145.7 mL/min (by C-G formula based on SCr of 0.87 mg/dL).    Lab Results   Component Value Date    HGBA1C 5.50 12/15/2021     Lab Results   Component Value Date    TSH 0.701 12/15/2021    FREET4 1.50 12/15/2021     Microbiology Results (last 10 days)     Procedure Component Value - Date/Time    COVID-19 and FLU A/B PCR - Swab, Nasopharynx [[id number]]  (Normal) Collected: 12/15/21 0634    Lab Status: Final result Specimen: Swab from Nasopharynx Updated: 12/15/21 0720     COVID19 Not Detected     Influenza A PCR Not Detected     Influenza B PCR Not Detected    Narrative:      Fact sheet for providers: https://www.fda.gov/media/214898/download    Fact sheet for patients: https://www.fda.gov/media/842140/download    Test performed by PCR.        Pain Management Panel     Pain Management Panel Latest Ref Rng & Units 12/16/2021    AMPHETAMINES SCREEN, URINE Negative Positive(A)    BARBITURATES SCREEN Negative Negative    BENZODIAZEPINE SCREEN, URINE Negative Negative    BUPRENORPHINEUR Negative Negative    COCAINE SCREEN, URINE Negative Negative    METHADONE SCREEN, URINE Negative Negative    METHAMPHETAMINEUR  Negative Positive(A)        Pertinent Radiology Results:  Imaging Results (All)     Procedure Component Value Units Date/Time    XR Chest 1 View [[id number]] Collected: 12/15/21 0512     Updated: 12/15/21 0514    Narrative:      FINDINGS:  Portable AP view(s) of the chest.  The heart and mediastinal contours are normal. The lungs are clear. No pneumothorax or pleural effusion.    Impression:      No acute cardiopulmonary findings.    Signer Name: Donald Porras MD   Signed: 12/15/2021 5:12 AM   Workstation Name: [email or web address]    [facility]     Test Results Pending at Discharge:  None     Discharge Disposition/Discharge Medications/Discharge Appointments     Discharge Disposition:   Home or Self Care    Condition at Discharge:  Stable     DME Prescribed at Discharge:  None     Discharge Diet:  Diet Instructions     Diet: Regular      Discharge Diet: Regular        Discharge Activity:  Activity Instructions     Activity as Tolerated          Code Status While Inpatient:  Code Status and Medical Interventions:   Ordered at: 12/15/21 1232     Code Status (Patient has no pulse and is not breathing):    CPR (Attempt to Resuscitate)     Medical Interventions (Patient has pulse or is breathing):    Full Support     Discharge Medications:     Discharge Medications      New Medications      Instructions Start Date   metoprolol tartrate 25 MG tablet  Commonly known as: LOPRESSOR   12.5 mg, Oral, Every 12 Hours Scheduled           Discharge Appointments:  Additional Instructions for the Follow-ups that You Need to Schedule     Call MD With Problems / Concerns   As directed      GENERAL WARNINGS: Return to ED or contact your primary care provider immediately if   your condition worsens or changes unexpectedly, if not improving as expected,   or if other problems arise.    Order Comments: GENERAL WARNINGS: Return to ED or contact your primary care provider immediately if your condition worsens or changes  unexpectedly, if not improving as expected, or if other problems arise.          Discharge Follow-up with PCP   As directed       Currently Documented PCP:    Provider, No Known    PCP Phone Number:    [phone]     Follow Up Details: 1 week         Discharge Follow-up with Specified Provider: Dr. Quiñones/Neva MAURO; 1 Month   As directed      To: Dr. Quiñones/Neva MAURO    Follow Up: 1 Month    Follow Up Details: Post cath and afib RVR hospitalzation             Attestation: I personally discussed the patient's hospital course, disposition, discharge planning, and discharge medications with attending physician, Dr. Sabiha Freeman DO, prior to time of discharge.     Ivanna Santos PA-C  Hospitalist Service -- [facility]       12/17/21  18:25 EST    Discharge Time: Greater than 30 minutes

## 2021-12-17 NOTE — NURSING NOTE
Discharge instructions,follow up appointments, prescriptions educational handouts and work excuse given For 12/15-12/17 to return to work 12/20 with no restrictions. Patient verbalizes understanding.

## (undated) DEVICE — RUNWAY RADL W/TOP PAD

## (undated) DEVICE — A2000 MULTI-USE SYRINGE KIT, P/N 701277-003KIT CONTENTS: 100ML CONTRAST RESERVOIR AND TUBING WITH CONTRAST SPIKE AND CLAMP: Brand: A2000 MULTI-USE SYRINGE KIT

## (undated) DEVICE — CATH F5 INF PIG145 110CM 6SH: Brand: INFINITI

## (undated) DEVICE — DRSNG SURESITE WNDW 4X4.5

## (undated) DEVICE — GLIDESHEATH SLENDER STAINLESS STEEL KIT: Brand: GLIDESHEATH SLENDER

## (undated) DEVICE — LN INJ CONTRST FLXCIL HP F/M LL 1200PSI10

## (undated) DEVICE — PAD, DEFIB, ADULT, RADIOTRANS, ZOLL: Brand: MEDLINE

## (undated) DEVICE — TR BAND RADIAL ARTERY COMPRESSION DEVICE: Brand: TR BAND

## (undated) DEVICE — DRAPE, RADIAL, STERILE: Brand: MEDLINE

## (undated) DEVICE — ST INF PRI SMRTSTE 20DRP 2VLV 24ML 117

## (undated) DEVICE — ST EXT IV SMARTSITE 2VLV SP M LL 5ML IV1

## (undated) DEVICE — PK CATH CARD 70

## (undated) DEVICE — LN SMPL O2 NASL/ORL SMART/CAPNOLINE PLS A/

## (undated) DEVICE — MODEL AT P65, P/N 701554-001KIT CONTENTS: HAND CONTROLLER, 3-WAY HIGH-PRESSURE STOPCOCK WITH ROTATING END AND PREMIUM HIGH-PRESSURE TUBING: Brand: ANGIOTOUCH® KIT

## (undated) DEVICE — RADIFOCUS OPTITORQUE ANGIOGRAPHIC CATHETER: Brand: OPTITORQUE

## (undated) DEVICE — GW INQW FIX/CORE PTFE J/3MM .035 260CM